# Patient Record
Sex: FEMALE | Race: WHITE | NOT HISPANIC OR LATINO | URBAN - METROPOLITAN AREA
[De-identification: names, ages, dates, MRNs, and addresses within clinical notes are randomized per-mention and may not be internally consistent; named-entity substitution may affect disease eponyms.]

---

## 2021-01-19 ENCOUNTER — OFFICE VISIT (OUTPATIENT)
Dept: URBAN - METROPOLITAN AREA CLINIC 31 | Facility: CLINIC | Age: 66
End: 2021-01-19

## 2021-01-19 VITALS
WEIGHT: 136 LBS | DIASTOLIC BLOOD PRESSURE: 74 MMHG | SYSTOLIC BLOOD PRESSURE: 118 MMHG | HEART RATE: 74 BPM | BODY MASS INDEX: 20.61 KG/M2 | OXYGEN SATURATION: 96 % | TEMPERATURE: 95.4 F | HEIGHT: 68 IN

## 2021-01-19 RX ORDER — ROSUVASTATIN CALCIUM 5 MG/1
1 TABLET TABLET, FILM COATED ORAL ONCE A DAY
Qty: 30 | Status: ACTIVE | COMMUNITY

## 2021-01-19 RX ORDER — L.ACID,FERM,PLA,RHA/B.BIF,LONG 126 MG
AS DIRECTED TABLET, DELAYED AND EXTENDED RELEASE ORAL
Status: ACTIVE | COMMUNITY

## 2021-01-19 RX ORDER — ASPIRIN 81 MG/1
1 TABLET TABLET, CHEWABLE ORAL ONCE A DAY
Qty: 30 | Status: ON HOLD | COMMUNITY

## 2021-01-19 RX ORDER — MELATONIN 3 MG
1 TABLET AT BEDTIME AS NEEDED TABLET ORAL ONCE A DAY
Qty: 30 | Status: ACTIVE | COMMUNITY

## 2021-01-19 NOTE — HPI-MIGRATED HPI
;     IBS : 64 y/o female patient presents today for a consultation for IBS. She admits a 3 yr hx of IBS. She admits associated symptoms at this time and describes them as bloating/gas as well as fecal leakage. Patient reports that she will have a bowel movement in which she feels that she has completly emptied her bowels but then she may go for a walk and will experience fecal leakage. She reports that she has to wear a pad due to fecal incontinence.   Patient reports that she has tried FD Guard, IB Guard to help relieve symptoms with mild relief. Patient reports that she will not take medication daily because she may not have the same symptoms each time she eats a gasey food such as cauliflower, brussel sprouts, or broccoli.   She denies any aggravating or alleviating factors at this time.   Currently reports 1 bowel movement a day with no strain. Her stools are soft and formed without blood, mucucs, melena, or rectal pain. She admits occasional pruritus ani she thinks it is from her hemorrhoids.   She has a hx of fecal incontinence in which she had to wear panty liners. She admits/denies any episode of fecal incontinence at this time.   She admits that he first colonoscopy was completed in 2005 with normal results.   Patient was referred to Dr. Montelongo by A Destiny GARCIA for abd pain and bloating. Patient was seen 2/7/2019 and a Fructose Breath Test was completed with a fructose intolerant. A Hydrogen Breath Test was also completed with negative results for SIBO.   Patient had a colonoscoppy completed 10/15/2013 with Dr. Gold revealing a moderately tortuous colon, sigmoid diverticulosis and small Internal Hemorrhoids. She has been placed on a 5 year surveillance colonoscopy per Dr. Gold   Patient had a EGD/COLON completed with [facility] 3/26/2019: Colonoscopy revealed mild diverticulosis in the sigmoid colon, descending colon, and in the distal transverse colon. Non-bleeding internal hemorrhoids were also found. The EGD revealed erosive gastritis, a few gastric polyps, widely patent pyloric channel without evidence of retained food in the stomach, and erythematous duodenopathy.    ;

## 2021-01-27 LAB
IMMUNOGLOBULIN A: 141
INTERPRETATION: (no result)
TISSUE TRANSGLUTAMINASE$AB, IGA: 1

## 2021-02-07 ENCOUNTER — WEB ENCOUNTER (OUTPATIENT)
Dept: URBAN - METROPOLITAN AREA CLINIC 35 | Facility: CLINIC | Age: 66
End: 2021-02-07

## 2021-03-16 ENCOUNTER — OFFICE VISIT (OUTPATIENT)
Dept: URBAN - METROPOLITAN AREA CLINIC 35 | Facility: CLINIC | Age: 66
End: 2021-03-16

## 2021-03-16 VITALS
HEART RATE: 75 BPM | BODY MASS INDEX: 21.07 KG/M2 | WEIGHT: 139 LBS | DIASTOLIC BLOOD PRESSURE: 70 MMHG | SYSTOLIC BLOOD PRESSURE: 111 MMHG | HEIGHT: 68 IN

## 2021-03-16 RX ORDER — L.ACID,FERM,PLA,RHA/B.BIF,LONG 126 MG
AS DIRECTED TABLET, DELAYED AND EXTENDED RELEASE ORAL
Status: DISCONTINUED | COMMUNITY

## 2021-03-16 RX ORDER — ROSUVASTATIN CALCIUM 5 MG/1
1 TABLET TABLET, FILM COATED ORAL ONCE A DAY
Qty: 30 | Status: ACTIVE | COMMUNITY

## 2021-03-16 RX ORDER — ASPIRIN 81 MG/1
1 TABLET TABLET, CHEWABLE ORAL ONCE A DAY
Qty: 30 | Status: ON HOLD | COMMUNITY

## 2021-03-16 RX ORDER — MELATONIN 5 MG
1 TABLET AT BEDTIME AS NEEDED TABLET ORAL ONCE A DAY
Status: ACTIVE | COMMUNITY

## 2021-03-16 RX ORDER — BIOTIN 10 MG
1 TABLET TABLET ORAL ONCE A DAY
Qty: 30 | Status: ACTIVE | COMMUNITY

## 2021-03-16 NOTE — HPI-MIGRATED HPI
;     IBS : 65-Year-old patient presents today for a follow up of IBS. She currently reports 1 bowel movement every 1-2 days without strain. Her stools are soft and formed. She feels she does not completely empty her bowels after each bowel movement. She denies any mucus, melena or blood in her stools. She denies any pruritus ani or rectal pain. She admits continued fecal leakage only while she is being physically active or anxious.   She reported starting Low FODMAP diet noting a dairy intolerance.       Last visit (01/19/2021) 64 y/o female patient presents today for a consultation for IBS. She admits a 3 yr hx of IBS. She admits associated symptoms at this time and describes them as bloating/gas as well as fecal leakage. Patient reports that she will have a bowel movement in which she feels that she has completely emptied her bowels but then she may go for a walk and will experience fecal leakage. She reports that she has to wear a pad due to fecal incontinence.   Patient reports that she has tried FD Guard, IB Guard to help relieve symptoms with mild relief. Patient reports that she will not take medication daily because she may not have the same symptoms each time she eats a gasey food such as cauliflower, brussel sprouts, or broccoli.   She denies any aggravating or alleviating factors at this time.   Currently reports 1 bowel movement a day with no strain. Her stools are soft and formed without blood, mucucs, melena, or rectal pain. She admits occasional pruritus ani she thinks it is from her hemorrhoids.   She has a hx of fecal incontinence in which she had to wear panty liners. She admits/denies any episode of fecal incontinence at this time.   She admits that he first colonoscopy was completed in 2005 with normal results.   Patient was referred to Dr. Montelongo by Dr. Destiny GARCIA for abd pain and bloating. Patient was seen 2/7/2019 and a Fructose Breath Test was completed with a fructose intolerant. A Hydrogen Breath Test was also completed with negative results for SIBO.   Patient had a colonoscoppy completed 10/15/2013 with Dr. Gold revealing a moderately tortuous colon, sigmoid diverticulosis and small Internal Hemorrhoids. She has been placed on a 5 year surveillance colonoscopy per Dr. Wisebram   Patient had a EGD/COLON completed with [facility] 3/26/2019: Colonoscopy revealed mild diverticulosis in the sigmoid colon, descending colon, and in the distal transverse colon. Non-bleeding internal hemorrhoids were also found. The EGD revealed erosive gastritis, a few gastric polyps, widely patent pyloric channel without evidence of retained food in the stomach, and erythematous duodenopathy.    ;

## 2021-05-18 ENCOUNTER — OFFICE VISIT (OUTPATIENT)
Dept: URBAN - METROPOLITAN AREA CLINIC 35 | Facility: CLINIC | Age: 66
End: 2021-05-18

## 2022-03-18 ENCOUNTER — WEB ENCOUNTER (OUTPATIENT)
Dept: URBAN - METROPOLITAN AREA CLINIC 33 | Facility: CLINIC | Age: 67
End: 2022-03-18

## 2022-03-24 ENCOUNTER — OFFICE VISIT (OUTPATIENT)
Dept: URBAN - METROPOLITAN AREA CLINIC 33 | Facility: CLINIC | Age: 67
End: 2022-03-24
Payer: MEDICARE

## 2022-03-24 VITALS
HEART RATE: 78 BPM | BODY MASS INDEX: 21.07 KG/M2 | DIASTOLIC BLOOD PRESSURE: 72 MMHG | WEIGHT: 139 LBS | HEIGHT: 68 IN | OXYGEN SATURATION: 97 % | SYSTOLIC BLOOD PRESSURE: 118 MMHG

## 2022-03-24 DIAGNOSIS — R12 HEARTBURN: ICD-10-CM

## 2022-03-24 DIAGNOSIS — K57.90 DIVERTICULOSIS: ICD-10-CM

## 2022-03-24 DIAGNOSIS — K58.8 OTHER IRRITABLE BOWEL SYNDROME: ICD-10-CM

## 2022-03-24 PROBLEM — 397881000: Status: ACTIVE | Noted: 2022-03-24

## 2022-03-24 PROBLEM — 1086911000119107: Status: ACTIVE | Noted: 2021-03-16

## 2022-03-24 PROCEDURE — 99213 OFFICE O/P EST LOW 20 MIN: CPT | Performed by: INTERNAL MEDICINE

## 2022-03-24 RX ORDER — FAMOTIDINE 40 MG/1
1 TABLET AT BEDTIME TABLET, FILM COATED ORAL ONCE A DAY
Qty: 30 | OUTPATIENT
Start: 2022-03-24

## 2022-03-24 RX ORDER — ROSUVASTATIN CALCIUM 5 MG/1
1 TABLET TABLET, FILM COATED ORAL ONCE A DAY
Qty: 30 | Status: ACTIVE | COMMUNITY

## 2022-03-24 RX ORDER — MELATONIN 5 MG
1 TABLET AT BEDTIME AS NEEDED TABLET ORAL ONCE A DAY
Status: ACTIVE | COMMUNITY

## 2022-03-24 RX ORDER — BIOTIN 10 MG
1 TABLET TABLET ORAL ONCE A DAY
Qty: 30 | Status: ACTIVE | COMMUNITY

## 2022-03-24 RX ORDER — ASPIRIN 81 MG/1
1 TABLET TABLET, CHEWABLE ORAL ONCE A DAY
Qty: 30 | Status: DISCONTINUED | COMMUNITY

## 2022-03-24 NOTE — HPI-HEARTBURN
Patient presents today for a consutation of heratburn symptoms.  Onset  (11/2021).  Described as a "nervous feeling"  in her thrat or globus,, burning in mid chest and epigastrium. Also oral reguritation and sour eructations, .  She has tried Famotidine 20 mg 1 Qpm with some relief. She began OTC Nexium 20 mg 1 QD x 2 weeks with significant relief.  She report symptoms returned after being off the Nexium x 4 days.  Last dose 2 weeks ago.  Patient denies dysphagia, bloating/gas, indigestion, early satiety, changes in appetite, coughing, or changes in bowel habits.    Patient has not had any recent changes in medications.

## 2022-03-24 NOTE — HPI-EPIGASTRIC PAIN
Admit  epigastric burning sensation with onset (11/2021).  She took OTC Nexium 20 mg 1 QD x 2 weeks with control of symptoms.  Consuming onions, broccoli, brussel sprouts, spicy food or over eating has been an aggravating factor.

## 2022-03-24 NOTE — HPI-IBS-C
Currently reports 1 bowel movement per day without strain. Her stools are soft and formed. Denies melena, blood or mucus in stools, pruritus ani or rectal pain/bleeding.   Denies fecal seepage only while physically active or anxious.  Last visit (3/16/2021)  65-Year-old patient presents today for a follow up of IBS.  She currently reports 1 bowel movement every 1-2 days without strain. Her stools are soft and formed. She feels she does not completely empty her bowels after each bowel movement. She denies any mucus, melena or blood in her stools. She denies any pruritus ani or rectal pain.   She admits continue fecal leakage only while she is being physically active or anxious.  She reported starting Low FODMAP diet noting a dairy intolerance

## 2022-04-12 ENCOUNTER — TELEPHONE ENCOUNTER (OUTPATIENT)
Dept: URBAN - METROPOLITAN AREA CLINIC 35 | Facility: CLINIC | Age: 67
End: 2022-04-12

## 2022-04-12 RX ORDER — FAMOTIDINE 40 MG/1
1 TABLET AT BEDTIME TABLET, FILM COATED ORAL ONCE A DAY
Qty: 90 | Refills: 3
Start: 2022-03-24

## 2022-06-20 ENCOUNTER — OFFICE VISIT (OUTPATIENT)
Dept: URBAN - METROPOLITAN AREA CLINIC 33 | Facility: CLINIC | Age: 67
End: 2022-06-20

## 2022-06-20 RX ORDER — FAMOTIDINE 40 MG/1
1 TABLET AT BEDTIME TABLET, FILM COATED ORAL ONCE A DAY
Qty: 90 | Refills: 3

## 2022-06-20 NOTE — HPI-IBS-C
Currently she reports --- bowel movements --- with/out strain. Her stools are --- with/out blood, mucus, or melena. She admits/denies episodes of rectal pain or pruritus ani.   Patient admits/denies any assoicated symptoms at this time.    Last visit (3/24/2022)  Currently reports 1 bowel movement per day without strain. Her stools are soft and formed. Denies melena, blood or mucus in stools, pruritus ani or rectal pain/bleeding.   Denies fecal seepage only while physically active or anxious.

## 2022-06-20 NOTE — HPI-EPIGASTRIC PAIN
She admits/denies continued epigastric pain. Patient admits/denies improvement in symptoms since her last visit.    Last visit (3/24/2022)  Admit  epigastric burning sensation with onset (11/2021).  She took OTC Nexium 20 mg 1 QD x 2 weeks with control of symptoms.  Consuming onions, broccoli, brussel sprouts, spicy food or over eating has been an aggravating factor.

## 2022-06-20 NOTE — HPI-HEARTBURN
Patient presents today for a 3 month follow up heartburn. She admits/denies any break through episodes of heartburn with the use of Famotidine 40 mg 1 PO at bedtime.   She admits/denies taking ---- to help control symptoms.    Last visit (3/24/2022)  Patient presents today for a consutation of heratburn symptoms.  Onset  (11/2021).  Described as a "nervous feeling"  in her thrat or globus,, burning in mid chest and epigastrium. Also oral reguritation and sour eructations, .  She has tried Famotidine 20 mg 1 Qpm with some relief. She began OTC Nexium 20 mg 1 QD x 2 weeks with significant relief.  She report symptoms returned after being off the Nexium x 4 days.  Last dose 2 weeks ago.  Patient denies dysphagia, bloating/gas, indigestion, early satiety, changes in appetite, coughing, or changes in bowel habits.    Patient has not had any recent changes in medications.

## 2022-07-28 ENCOUNTER — OFFICE VISIT (OUTPATIENT)
Dept: URBAN - METROPOLITAN AREA CLINIC 33 | Facility: CLINIC | Age: 67
End: 2022-07-28
Payer: MEDICARE

## 2022-07-28 VITALS
BODY MASS INDEX: 20.16 KG/M2 | DIASTOLIC BLOOD PRESSURE: 68 MMHG | HEIGHT: 68 IN | WEIGHT: 133 LBS | SYSTOLIC BLOOD PRESSURE: 106 MMHG | HEART RATE: 79 BPM | OXYGEN SATURATION: 98 %

## 2022-07-28 DIAGNOSIS — K58.8 OTHER IRRITABLE BOWEL SYNDROME: ICD-10-CM

## 2022-07-28 DIAGNOSIS — K57.92 DIVERTICULITIS: ICD-10-CM

## 2022-07-28 DIAGNOSIS — Z80.0 FAMILY HISTORY OF COLON CANCER: ICD-10-CM

## 2022-07-28 DIAGNOSIS — R12 HEARTBURN: ICD-10-CM

## 2022-07-28 PROBLEM — 10743008 IRRITABLE BOWEL SYNDROME: Status: ACTIVE | Noted: 2021-01-19

## 2022-07-28 PROBLEM — 312824007: Status: ACTIVE | Noted: 2022-07-28

## 2022-07-28 PROBLEM — 307496006: Status: ACTIVE | Noted: 2022-07-28

## 2022-07-28 PROBLEM — 16331000 HEARTBURN: Status: ACTIVE | Noted: 2022-03-24

## 2022-07-28 PROCEDURE — 99213 OFFICE O/P EST LOW 20 MIN: CPT | Performed by: INTERNAL MEDICINE

## 2022-07-28 RX ORDER — ROSUVASTATIN CALCIUM 5 MG/1
1 TABLET TABLET, FILM COATED ORAL ONCE A DAY
Qty: 30 | Status: ACTIVE | COMMUNITY

## 2022-07-28 RX ORDER — FAMOTIDINE 40 MG/1
1 TABLET AT BEDTIME TABLET, FILM COATED ORAL ONCE A DAY
Qty: 90 | Refills: 3

## 2022-07-28 RX ORDER — FAMOTIDINE 40 MG/1
1 TABLET AT BEDTIME TABLET, FILM COATED ORAL ONCE A DAY
Qty: 90 | Refills: 3 | Status: ACTIVE | COMMUNITY

## 2022-07-28 RX ORDER — MELATONIN 5 MG
1 TABLET AT BEDTIME AS NEEDED TABLET ORAL ONCE A DAY
Status: ACTIVE | COMMUNITY

## 2022-07-28 RX ORDER — BIOTIN 10 MG
1 TABLET TABLET ORAL ONCE A DAY
Qty: 30 | Status: ACTIVE | COMMUNITY

## 2022-07-28 NOTE — HPI-EPIGASTRIC PAIN
She denies continued epigastric pain at this time. Patient admits improvement in symptoms and states "things have settled down"    Last visit (3/24/2022)  Admit  epigastric burning sensation with onset (11/2021).  She took OTC Nexium 20 mg 1 QD x 2 weeks with control of symptoms.  Consuming onions, broccoli, brussel sprouts, spicy food or over eating has been an aggravating factor. She admits/denies continued epigastric pain. Patient admits/denies improvement in symptoms since her last visit.

## 2022-07-28 NOTE — PHYSICAL EXAM HENT:
Head, normocephalic, atraumatic, Face, Face within normal limits, Ears, External ears within normal limits. MC- II

## 2022-07-28 NOTE — HPI-IBS-C
Currently reports 1 bowel movement per day  without strain. Her stools are formed without blood, mucus, or melena. She denies any episodes of rectal pain or pruritus ani. She reports 1-2 times a year she may use a suppository to relieve any symptoms from the hemorrhoids that she has.   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   Last visit (3/24/2022)  Currently reports 1 bowel movement per day without strain. Her stools are soft and formed. Denies melena, blood or mucus in stools, pruritus ani or rectal pain/bleeding.   Denies fecal seepage only while physically active or anxious.

## 2023-04-21 ENCOUNTER — ERX REFILL RESPONSE (OUTPATIENT)
Dept: URBAN - METROPOLITAN AREA CLINIC 33 | Facility: CLINIC | Age: 68
End: 2023-04-21

## 2023-04-21 RX ORDER — FAMOTIDINE 40 MG/1
1 TABLET AT BEDTIME TABLET, FILM COATED ORAL ONCE A DAY
Qty: 90 | Refills: 3 | OUTPATIENT

## 2023-12-02 ENCOUNTER — OUT OF OFFICE VISIT (OUTPATIENT)
Dept: URBAN - METROPOLITAN AREA SURGERY CENTER 8 | Facility: SURGERY CENTER | Age: 68
End: 2023-12-02

## 2023-12-19 ENCOUNTER — CLAIMS CREATED FROM THE CLAIM WINDOW (OUTPATIENT)
Dept: URBAN - METROPOLITAN AREA CLINIC 35 | Facility: CLINIC | Age: 68
End: 2023-12-19
Payer: MEDICARE

## 2023-12-19 VITALS
SYSTOLIC BLOOD PRESSURE: 120 MMHG | DIASTOLIC BLOOD PRESSURE: 72 MMHG | BODY MASS INDEX: 20.76 KG/M2 | WEIGHT: 137 LBS | HEIGHT: 68 IN

## 2023-12-19 DIAGNOSIS — R12 HEARTBURN: ICD-10-CM

## 2023-12-19 DIAGNOSIS — Z80.0 FAMILY HISTORY OF COLON CANCER: ICD-10-CM

## 2023-12-19 DIAGNOSIS — K57.92 DIVERTICULITIS: ICD-10-CM

## 2023-12-19 DIAGNOSIS — K58.8 OTHER IRRITABLE BOWEL SYNDROME: ICD-10-CM

## 2023-12-19 PROCEDURE — 99214 OFFICE O/P EST MOD 30 MIN: CPT | Performed by: PHYSICIAN ASSISTANT

## 2023-12-19 RX ORDER — CHOLECALCIFEROL (VITAMIN D3) 50 MCG
1 TABLET TABLET ORAL ONCE A DAY
Status: ACTIVE | COMMUNITY

## 2023-12-19 RX ORDER — FAMOTIDINE 40 MG/1
1 TABLET AT BEDTIME TABLET, FILM COATED ORAL ONCE A DAY
Qty: 90 | Refills: 3

## 2023-12-19 RX ORDER — PANTOPRAZOLE SODIUM 40 MG/1
1 TABLET TABLET, DELAYED RELEASE ORAL ONCE A DAY
Qty: 30 | Refills: 1 | OUTPATIENT
Start: 2023-12-19

## 2023-12-19 RX ORDER — MELATONIN 5 MG
1 TABLET AT BEDTIME AS NEEDED TABLET ORAL ONCE A DAY
Status: ACTIVE | COMMUNITY

## 2023-12-19 RX ORDER — ROSUVASTATIN CALCIUM 5 MG/1
1 TABLET TABLET, FILM COATED ORAL ONCE A DAY
Qty: 30 | Status: ACTIVE | COMMUNITY

## 2023-12-19 RX ORDER — BIOTIN 10 MG
1 TABLET TABLET ORAL ONCE A DAY
Qty: 30 | Status: ACTIVE | COMMUNITY

## 2023-12-19 RX ORDER — FAMOTIDINE 40 MG/1
1 TABLET AT BEDTIME TABLET, FILM COATED ORAL ONCE A DAY
Qty: 90 | Refills: 3 | Status: ACTIVE | COMMUNITY

## 2023-12-19 NOTE — HPI-HEARTBURN
68 year old female patient presents today for a follow up of heartburn. She admits she was concerned about being on Famotidine 40 mg for a long period of time and she stopped taking it and within 48 hours she had to get back on medication. She then did a trial of reducing medication to half and was doing okay with this. She admits she had an episode of acidity and she went back on 40 mg after this and it has been about 3 weeks and she cannot get symptoms under control again. She admits to regurgitation and belching. She admits continued epiosdes of heartburn.  She has acid in her throat.  She can't seem to get back to "normal"' since reducing her dose.    Last visit (7/28/2022): Patient presents today for a 3 month follow up. She admits the continued use of Famotidine 40 mg 1 PO Qhs does control any symptoms that she may experience.    Last visit (3/24/2022)  Patient presents today for a consutation of heratburn symptoms.  Onset  (11/2021).  Described as a "nervous feeling"  in her throat or globus, burning in mid chest and epigastrium. Also oral reguritation and sour eructations.  She has tried Famotidine 20 mg 1 Qpm with some relief. She began OTC Nexium 20 mg 1 QD x 2 weeks with significant relief.  She report symptoms returned after being off the Nexium x 4 days.  Last dose 2 weeks ago.  Patient denies dysphagia, bloating/gas, indigestion, early satiety, changes in appetite, coughing, or changes in bowel habits.    Patient has not had any recent changes in medications.

## 2024-01-18 ENCOUNTER — OFFICE VISIT (OUTPATIENT)
Dept: URBAN - METROPOLITAN AREA CLINIC 35 | Facility: CLINIC | Age: 69
End: 2024-01-18
Payer: MEDICARE

## 2024-01-18 ENCOUNTER — DASHBOARD ENCOUNTERS (OUTPATIENT)
Age: 69
End: 2024-01-18

## 2024-01-18 VITALS
HEIGHT: 68 IN | DIASTOLIC BLOOD PRESSURE: 78 MMHG | BODY MASS INDEX: 20.76 KG/M2 | WEIGHT: 137 LBS | SYSTOLIC BLOOD PRESSURE: 116 MMHG

## 2024-01-18 DIAGNOSIS — R14.0 ABDOMINAL BLOATING: ICD-10-CM

## 2024-01-18 DIAGNOSIS — K57.92 DIVERTICULITIS: ICD-10-CM

## 2024-01-18 DIAGNOSIS — K58.8 OTHER IRRITABLE BOWEL SYNDROME: ICD-10-CM

## 2024-01-18 PROCEDURE — 99214 OFFICE O/P EST MOD 30 MIN: CPT | Performed by: PHYSICIAN ASSISTANT

## 2024-01-18 RX ORDER — FAMOTIDINE 40 MG/1
1 TABLET AT BEDTIME TABLET, FILM COATED ORAL ONCE A DAY
Qty: 90 | Refills: 3

## 2024-01-18 RX ORDER — PANTOPRAZOLE SODIUM 40 MG/1
1 TABLET TABLET, DELAYED RELEASE ORAL ONCE A DAY
Qty: 30 | Refills: 1 | OUTPATIENT

## 2024-01-18 RX ORDER — BIOTIN 10 MG
1 TABLET TABLET ORAL ONCE A DAY
Qty: 30 | Status: ACTIVE | COMMUNITY

## 2024-01-18 RX ORDER — SODIUM, POTASSIUM,MAG SULFATES 17.5-3.13G
AS DIRECTED SOLUTION, RECONSTITUTED, ORAL ORAL
Qty: 1 | Refills: 0 | OUTPATIENT
Start: 2024-01-18 | End: 2024-01-20

## 2024-01-18 RX ORDER — MELATONIN 5 MG
1 TABLET AT BEDTIME AS NEEDED TABLET ORAL ONCE A DAY
Status: ACTIVE | COMMUNITY

## 2024-01-18 RX ORDER — PANTOPRAZOLE SODIUM 40 MG/1
1 TABLET TABLET, DELAYED RELEASE ORAL ONCE A DAY
Qty: 30 | Refills: 1 | Status: ACTIVE | COMMUNITY
Start: 2023-12-19

## 2024-01-18 RX ORDER — FAMOTIDINE 40 MG/1
1 TABLET AT BEDTIME TABLET, FILM COATED ORAL ONCE A DAY
Qty: 90 | Refills: 3 | Status: ACTIVE | COMMUNITY

## 2024-01-18 RX ORDER — CHOLECALCIFEROL (VITAMIN D3) 50 MCG
1 TABLET TABLET ORAL ONCE A DAY
Status: ACTIVE | COMMUNITY

## 2024-01-18 RX ORDER — ROSUVASTATIN CALCIUM 5 MG/1
1 TABLET TABLET, FILM COATED ORAL ONCE A DAY
Qty: 30 | Status: ACTIVE | COMMUNITY

## 2024-01-18 NOTE — HPI-COLORECTAL CANCER SCREENING
Pt is due for colonoscopy in March. She has a BM every other day.  She denies constipation.  Her stools are formed.  She did recently ate at [email or web address], and feels that triggered bloating and gas and digestive issues.  She denies any blood in her stool, mucus or melena.  Pt has done FODMAP diet with elimination of foods, and knows she cannot have oinions. Father with Crohn's disease. Brother has colon polyps.  Last colonoscopy was 03/2019: random bx were normal (at the time was having diarrhea), also diverticulosis and internal hemorrhoids  At last visit, IBS : 65-Year-old patient presents today for a follow up of IBS. She currently reports 1 bowel movement every 1-2 days without strain. Her stools are soft and formed. She feels she does not completely empty her bowels after each bowel movement. She denies any mucus, melena or blood in her stools. She denies any pruritus ani or rectal pain. She admits continued fecal leakage only while she is being physically active or anxious.  She reported starting Low FODMAP diet noting a dairy intolerance.     Last visit (01/19/2021) 64 y/o female patient presents today for a consultation for IBS. She admits a 3 yr hx of IBS. She admits associated symptoms at this time and describes them as bloating/gas as well as fecal leakage. Patient reports that she will have a bowel movement in which she feels that she has completely emptied her bowels but then she may go for a walk and will experience fecal leakage. She reports that she has to wear a pad due to fecal incontinence.  Patient reports that she has tried FD Guard, IB Guard to help relieve symptoms with mild relief. Patient reports that she will not take medication daily because she may not have the same symptoms each time she eats a gasey food such as cauliflower, brussel sprouts, or broccoli.  She denies any aggravating or alleviating factors at this time.  Currently reports 1 bowel movement a day with no strain. Her stools are soft and formed without blood, mucucs, melena, or rectal pain. She admits occasional pruritus ani she thinks it is from her hemorrhoids.  She has a hx of fecal incontinence in which she had to wear panty liners. She admits/denies any episode of fecal incontinence at this time.  She admits that he first colonoscopy was completed in 2005 with normal results.  Patient was referred to Dr. Montelongo by Dr. Destiny GARCIA for abd pain and bloating. Patient was seen 2/7/2019 and a Fructose Breath Test was completed with a fructose intolerant. A Hydrogen Breath Test was also completed with negative results for SIBO.  Patient had a colonoscoppy completed 10/15/2013 with Dr. Gold revealing a moderately tortuous colon, sigmoid diverticulosis and small Internal Hemorrhoids. She has been placed on a 5 year surveillance colonoscopy per Dr. Gold  Patient had a EGD/COLON completed with [facility] 3/26/2019: Colonoscopy revealed mild diverticulosis in the sigmoid colon, descending colon, and in the distal transverse colon. Non-bleeding internal hemorrhoids were also found. The EGD revealed erosive gastritis, a few gastric polyps, widely patent pyloric channel without evidence of retained food in the stomach, and erythematous duodenopathy.

## 2024-01-18 NOTE — HPI-HEARTBURN
Patient presents today for a follow up of heartburn. She admits continued use of Pantoprazole and Famotidine. She admits continued regurgitation and belching. She states she feels she has not returned to normal with symptoms. She denies improvement in heartburn and acidity in her throat. She admits to bloating and gas. She admits new things are giving her heartburn that have not given her heartburn in the past.   03/2019 EGD Gastritis, gastric erosions, duodenitis   Last visit: 68 year old female patient presents today for a follow up of heartburn. She admits she was concerned about being on Famotidine 40 mg for a long period of time, and she stopped taking it and within 48 hours she had to get back on medication. She then did a trial of reducing medication to half and was doing okay with this. She admits she had an episode of acidity, and she went back on 40 mg after this and it has been about 3 weeks, and she cannot get symptoms under control again. She admits to regurgitation and belching. She admits continued episodes of heartburn.  She has acid in her throat.  She can't seem to get back to "normal"' since reducing her dose.

## 2024-01-19 ENCOUNTER — TELEPHONE ENCOUNTER (OUTPATIENT)
Dept: URBAN - METROPOLITAN AREA CLINIC 36 | Facility: CLINIC | Age: 69
End: 2024-01-19

## 2024-01-22 ENCOUNTER — TELEPHONE ENCOUNTER (OUTPATIENT)
Dept: URBAN - METROPOLITAN AREA CLINIC 36 | Facility: CLINIC | Age: 69
End: 2024-01-22

## 2024-01-24 ENCOUNTER — TELEPHONE ENCOUNTER (OUTPATIENT)
Dept: URBAN - METROPOLITAN AREA CLINIC 35 | Facility: CLINIC | Age: 69
End: 2024-01-24

## 2024-01-31 ENCOUNTER — TELEPHONE ENCOUNTER (OUTPATIENT)
Dept: URBAN - METROPOLITAN AREA CLINIC 35 | Facility: CLINIC | Age: 69
End: 2024-01-31

## 2024-02-01 ENCOUNTER — LAB (OUTPATIENT)
Dept: URBAN - METROPOLITAN AREA CLINIC 4 | Facility: CLINIC | Age: 69
End: 2024-02-01
Payer: MEDICARE

## 2024-02-01 ENCOUNTER — COL/EGD (OUTPATIENT)
Dept: URBAN - METROPOLITAN AREA SURGERY CENTER 8 | Facility: SURGERY CENTER | Age: 69
End: 2024-02-01
Payer: MEDICARE

## 2024-02-01 DIAGNOSIS — R12 BURNING REFLUX: ICD-10-CM

## 2024-02-01 DIAGNOSIS — Z12.11 COLON CANCER SCREENING: ICD-10-CM

## 2024-02-01 DIAGNOSIS — K21.9 ACID REFLUX: ICD-10-CM

## 2024-02-01 DIAGNOSIS — K31.89 OTHER DISEASES OF STOMACH AND DUODENUM: ICD-10-CM

## 2024-02-01 DIAGNOSIS — K29.70 GASTRITIS, UNSPECIFIED, WITHOUT BLEEDING: ICD-10-CM

## 2024-02-01 DIAGNOSIS — Z80.0 BROTHER AT YOUNG AGE FAMILY HISTORY OF COLON CANCER: ICD-10-CM

## 2024-02-01 DIAGNOSIS — K31.89 ACHYLIA: ICD-10-CM

## 2024-02-01 DIAGNOSIS — T47.8X5A ADVERSE EFFECT OF OTHER AGENTS PRIMARILY AFFECTING GASTROINTESTINAL SYSTEM, INITIAL ENCOUNTER: ICD-10-CM

## 2024-02-01 PROCEDURE — 43239 EGD BIOPSY SINGLE/MULTIPLE: CPT | Performed by: INTERNAL MEDICINE

## 2024-02-01 PROCEDURE — 88305 TISSUE EXAM BY PATHOLOGIST: CPT | Performed by: PATHOLOGY

## 2024-02-01 PROCEDURE — 88312 SPECIAL STAINS GROUP 1: CPT | Performed by: PATHOLOGY

## 2024-02-01 PROCEDURE — G0105 COLORECTAL SCRN; HI RISK IND: HCPCS | Performed by: INTERNAL MEDICINE

## 2024-02-06 ENCOUNTER — OV EP (OUTPATIENT)
Dept: URBAN - METROPOLITAN AREA CLINIC 35 | Facility: CLINIC | Age: 69
End: 2024-02-06

## 2024-02-08 ENCOUNTER — OV EP (OUTPATIENT)
Dept: URBAN - METROPOLITAN AREA CLINIC 35 | Facility: CLINIC | Age: 69
End: 2024-02-08

## 2024-02-20 ENCOUNTER — OV EP (OUTPATIENT)
Dept: URBAN - METROPOLITAN AREA CLINIC 35 | Facility: CLINIC | Age: 69
End: 2024-02-20

## 2024-07-29 ENCOUNTER — TELEPHONE ENCOUNTER (OUTPATIENT)
Dept: URBAN - METROPOLITAN AREA CLINIC 35 | Facility: CLINIC | Age: 69
End: 2024-07-29

## 2025-03-07 ENCOUNTER — TELEPHONE ENCOUNTER (OUTPATIENT)
Dept: URBAN - METROPOLITAN AREA CLINIC 35 | Facility: CLINIC | Age: 70
End: 2025-03-07

## 2025-03-07 RX ORDER — FAMOTIDINE 40 MG/1
1 TABLET AT BEDTIME TABLET, FILM COATED ORAL ONCE A DAY
Qty: 30 | Refills: 0

## 2025-03-17 ENCOUNTER — OFFICE VISIT (OUTPATIENT)
Dept: URBAN - METROPOLITAN AREA CLINIC 35 | Facility: CLINIC | Age: 70
End: 2025-03-17
Payer: MEDICARE

## 2025-03-17 VITALS
DIASTOLIC BLOOD PRESSURE: 80 MMHG | HEIGHT: 68 IN | WEIGHT: 132 LBS | BODY MASS INDEX: 20 KG/M2 | SYSTOLIC BLOOD PRESSURE: 116 MMHG

## 2025-03-17 DIAGNOSIS — K44.9 HIATAL HERNIA: ICD-10-CM

## 2025-03-17 DIAGNOSIS — K58.8 OTHER IRRITABLE BOWEL SYNDROME: ICD-10-CM

## 2025-03-17 DIAGNOSIS — R12 HEARTBURN: ICD-10-CM

## 2025-03-17 DIAGNOSIS — Z80.0 FAMILY HISTORY OF COLON CANCER: ICD-10-CM

## 2025-03-17 DIAGNOSIS — K57.92 DIVERTICULITIS: ICD-10-CM

## 2025-03-17 PROCEDURE — 99213 OFFICE O/P EST LOW 20 MIN: CPT | Performed by: PHYSICIAN ASSISTANT

## 2025-03-17 RX ORDER — FAMOTIDINE 40 MG/1
1 TABLET AT BEDTIME TABLET, FILM COATED ORAL ONCE A DAY
Qty: 90 TABLET | Refills: 3

## 2025-03-17 RX ORDER — FAMOTIDINE 40 MG/1
1 TABLET AT BEDTIME TABLET, FILM COATED ORAL ONCE A DAY
Qty: 30 | Refills: 0 | Status: ACTIVE | COMMUNITY

## 2025-03-17 RX ORDER — ROSUVASTATIN CALCIUM 5 MG/1
1 TABLET TABLET, FILM COATED ORAL ONCE A DAY
Qty: 30 | Status: ACTIVE | COMMUNITY

## 2025-03-17 RX ORDER — CHOLECALCIFEROL (VITAMIN D3) 50 MCG
1 TABLET TABLET ORAL ONCE A DAY
Status: ACTIVE | COMMUNITY

## 2025-03-17 RX ORDER — PANTOPRAZOLE SODIUM 40 MG/1
1 TABLET TABLET, DELAYED RELEASE ORAL ONCE A DAY
Qty: 30 | Refills: 1 | Status: ON HOLD | COMMUNITY

## 2025-03-17 RX ORDER — BIOTIN 10 MG
1 TABLET TABLET ORAL ONCE A DAY
Qty: 30 | Status: ACTIVE | COMMUNITY

## 2025-03-17 RX ORDER — MELATONIN 5 MG
1 TABLET AT BEDTIME AS NEEDED TABLET ORAL ONCE A DAY
Status: ACTIVE | COMMUNITY

## 2025-03-17 NOTE — HPI-HEARTBURN
69 year old female patient presents today for a yearly follow up and medication refill. She admits continued use of Famotidine Tablet 40 mg. She admits control of symptoms. She denies any continued regurgitation and belching. She denies continued acidity in her throat, bloating, and gas unless she eats one of her trigger foods such as onions.  EGD (2/1/2024): - Normal hypopharynx. - Small hiatal hernia. - Normal mucosa was found in the entire esophagus.  Biopsied. - Erythematous mucosa in the stomach.  Biopsied. - Normal examined duodenum. Duodenum, Second Part (D2), Duodenal Bulb, Biopsy: NO SIGNIFICANT ABNORMALITY. Stomach, Antrum and Body, Biopsy: CHEMICAL/REACTIVE GASTROPATHY.PROTON PUMP INHIBITOR EFFECT. Esophagus, Lower-Third, Biopsy: SQUAMOUS MUCOSA WITH REFLUX-TYPE CHANGES; NO COLUMNAR MUCOSA             IDENTIFIED.  Last visit (1/18/2024): Patient presents today for a follow up of heartburn. She admits continued use of Pantoprazole and Famotidine. She admits continued regurgitation and belching. She states she feels she has not returned to normal with symptoms. She denies improvement in heartburn and acidity in her throat. She admits to bloating and gas. She admits new things are giving her heartburn that have not given her heartburn in the past.   03/2019 EGD Gastritis, gastric erosions, duodenitis   Last visit: 68 year old female patient presents today for a follow up of heartburn. She admits she was concerned about being on Famotidine 40 mg for a long period of time, and she stopped taking it and within 48 hours she had to get back on medication. She then did a trial of reducing medication to half and was doing okay with this. She admits she had an episode of acidity, and she went back on 40 mg after this and it has been about 3 weeks, and she cannot get symptoms under control again. She admits to regurgitation and belching. She admits continued episodes of heartburn.  She has acid in her throat.  She can't seem to get back to "normal"' since reducing her dose.